# Patient Record
Sex: MALE | Employment: UNEMPLOYED | URBAN - METROPOLITAN AREA
[De-identification: names, ages, dates, MRNs, and addresses within clinical notes are randomized per-mention and may not be internally consistent; named-entity substitution may affect disease eponyms.]

---

## 2020-08-31 NOTE — PROGRESS NOTES
[facility]  [address], [address]  Phone: [phone]  Fax: [phone]        INITIAL CONSULTATION      HISTORY OF PRESENT ILLNESS:  Cara Velazquez is a 48 y.o. male whom is referred from Andra Reich MD secondary to neck pain radiating into the BUE x 15 years without trauma. He rates his pain 10/10. He really has diffuse, non-focal widespread diffusion of pain. He has treated with Prednisone, Naprosyn, and Neurontin with minimal benefit. Pt is intolerant to NEURONTIN. Patient denies previous spinal surgery, injections, or physical therapy/chiropractic care. Pt denies change in bowel or bladder habits. Pt denies dropping things or loss of balance. Pt denies fever, weight loss, or skin changes. Pt denies h/o cancer or DM. Pt is a smoker. PmHx of anxiety, depression, HTN. ER note from Dr. Per Richards dated 7/30/2019 indicating patient presented for BUE numbness. ER note from Dr. Lula Lee dated 10/12/2019 indicating patient presented for anxiety. Note from Andra Reich MD dated 7/22/0202 indicating patient was in non-compliance with medical regime. Indicated he would not schedule a f/u with the pt and the pt agreed to seek care with another provider. Indicated they gave a prescription for Neurontin. The pt took it for 5-6 days and discontinued because it made him feel weird. Pt had c/o BUE and BLE pain. Complained he can barely walk. He was referred to an orthopedist and the pt did not follow through. Pt asked for an alternative pain medication, he declined. C spine MRI dated 4/13/2019 films independently reviewed. Per report, multilevel degenerative spondylosis most pronounced C4-C6 and cervical kyphosis. Degenerative spondylosis with posterior disc protrusion C5/6 with moderate canal stenosis and cord flattening, moderate to severe degenerative left foraminal stenosis.  Posterior disc protrusion and degenerative spondylosis C6/7 with cord flattening with mild to moderate canal stenosis. Posterior disc protrusion C4/5 with mild cord flattening, without significant stenosis. No acute osseous finding, no intrinsic cord lesion. Nonspecific mildly enlarged cervical lymph nodes. The patient is RHD.  reviewed. Body mass index is 28.47 kg/m². PCP: La Nena Khan MD    Past Medical History:   Diagnosis Date    Anxiety     Depression     Hypertension         History reviewed. No pertinent surgical history. Social History     Tobacco Use    Smoking status: Current Every Day Smoker    Smokeless tobacco: Never Used   Substance Use Topics    Alcohol use: Not Currently       Work status: N/A. Marital status: N/A. No Known Allergies       History reviewed. No pertinent family history. REVIEW OF SYSTEMS  Constitutional symptoms: Negative  Eyes: Negative  Ears, Nose, Throat, and Mouth: Negative  Cardiovascular: Negative  Respiratory: Negative  Genitourinary: Negative  Integumentary (Skin and/or breast): Negative  Musculoskeletal: Positive for diffuse non-focal widespread diffusion of pain. Extremities: Negative for edema. Endocrine/Rheumatologic: Negative  Hematologic/Lymphatic: Negative  Allergic/Immunologic: Negative  Psychiatric: Negative       PHYSICAL EXAMINATION  Visit Vitals  BP (!) 134/95 (BP 1 Location: Left arm, BP Patient Position: Sitting)   Pulse 84   Temp 98.1 °F (36.7 °C) (Temporal)   Ht 5' 7\" (1.702 m)   Wt 181 lb 12.8 oz (82.5 kg)   SpO2 95%   BMI 28.47 kg/m²       CONSTITUTIONAL: NAD, A&O x 3  HEART: Regular rate and rhythm  GASTROINTESTINAL: Positive bowel sounds, soft, nontender, and nondistended  LUNGS: Clear to auscultation bilaterally. SKIN: Negative for rash. RANGE OF MOTION: The patient has full passive range of motion in all four extremities. SENSATION: Decreased sensation to light touch on digits 1, 3 and 5 of the RUE and on the RLE in a L4 and S1 distribution.  Otherwise, sensation is intact to light touch throughout. MOTOR:   Straight Leg Raise: Negative, bilateral  Dewey: Negative, bilateral  Tandem Gait: Neg. Deep tendon reflexes are 1 at the biceps, triceps, and brachioradialis bilaterally. Deep tendon reflexes are 2 at the knees and 1 at the ankles bilaterally. Shoulder AB/Flex Elbow Flex Wrist Ext Elbow Ext Wrist Flex Hand Intrin Tone   Right +4/5 +4/5 +4/5 +4/5 +4/5 +4/5 +4/5   Left +4/5 +4/5 +4/5 +4/5 +4/5 +4/5 +4/5              Hip Flex Knee Ext Knee Flex Ankle DF GTE Ankle PF Tone   Right +4/5 +4/5 +4/5 +4/5 +4/5 +4/5 +4/5   Left +4/5 +4/5 +4/5 +4/5 +4/5 +4/5 +4/5     RADIOGRAPHS  Preliminary reading of L spine plain films dated 9/1/2020 revealed:  Sacralization of L5 with Grade 1 anterolisthesis of L4 on L5 and likely pars defect L4, L5. Moderate disc space narrowing at L4-5. Small anterior osteophytes noted at L4, 5. No acute pathology identified. These are being sent out for official reading by Dr. Katina Huffman. ASSESSMENT   Diagnoses and all orders for this visit:    1. Lumbar pain  -     AMB POC XRAY, SPINE, LUMBOSACRAL; 2 O  -     EMG FOUR EXTREMITIES; Future  -     pregabalin (LYRICA) 50 mg capsule; Take 1 Cap by mouth two (2) times a day. Max Daily Amount: 100 mg.    2. Cervical neuritis  -     EMG FOUR EXTREMITIES; Future  -     pregabalin (LYRICA) 50 mg capsule; Take 1 Cap by mouth two (2) times a day. Max Daily Amount: 100 mg.    3. HNP (herniated nucleus pulposus), lumbar  -     EMG FOUR EXTREMITIES; Future  -     CT SPINE LUMB WO CONT; Future  -     pregabalin (LYRICA) 50 mg capsule; Take 1 Cap by mouth two (2) times a day. Max Daily Amount: 100 mg.    4. Lumbar neuritis  -     EMG FOUR EXTREMITIES; Future  -     CT SPINE LUMB WO CONT; Future  -     pregabalin (LYRICA) 50 mg capsule; Take 1 Cap by mouth two (2) times a day. Max Daily Amount: 100 mg.    5. DDD (degenerative disc disease), lumbar  -     EMG FOUR EXTREMITIES;  Future  -     CT SPINE LUMB WO CONT; Future  - pregabalin (LYRICA) 50 mg capsule; Take 1 Cap by mouth two (2) times a day. Max Daily Amount: 100 mg.    6. Spondylolisthesis, acquired  -     EMG FOUR EXTREMITIES; Future  -     CT SPINE LUMB WO CONT; Future  -     pregabalin (LYRICA) 50 mg capsule; Take 1 Cap by mouth two (2) times a day. Max Daily Amount: 100 mg. IMPRESSIONS/RECOMMENDATIONS:  Patient presents today with c/o diffuse non-focal widespread diffusion of pain. Multiple treatment options were discussed. I will try him on Lyrica 50 mg BID. The risks, benefits, and potential side effects of this medication were discussed. Patient understands and wishes to proceed. Patient advised to call the office if intolerant to new medication. I will refer him to Neurology for a BUE/BLE EMG. I will order a L spine CT. Patient is neurologically intact. I will see the patient back following the EMG and CT or earlier if needed. Written by Migel Nance, as dictated by Mechelle Stoll MD  I examined the patient, reviewed and agree with the note.

## 2020-09-01 ENCOUNTER — OFFICE VISIT (OUTPATIENT)
Dept: ORTHOPEDIC SURGERY | Age: 50
End: 2020-09-01

## 2020-09-01 VITALS
SYSTOLIC BLOOD PRESSURE: 134 MMHG | OXYGEN SATURATION: 95 % | BODY MASS INDEX: 28.53 KG/M2 | HEART RATE: 84 BPM | TEMPERATURE: 98.1 F | HEIGHT: 67 IN | WEIGHT: 181.8 LBS | DIASTOLIC BLOOD PRESSURE: 95 MMHG

## 2020-09-01 DIAGNOSIS — M51.36 DDD (DEGENERATIVE DISC DISEASE), LUMBAR: ICD-10-CM

## 2020-09-01 DIAGNOSIS — M54.12 CERVICAL NEURITIS: ICD-10-CM

## 2020-09-01 DIAGNOSIS — M51.26 HNP (HERNIATED NUCLEUS PULPOSUS), LUMBAR: ICD-10-CM

## 2020-09-01 DIAGNOSIS — M43.10 SPONDYLOLISTHESIS, ACQUIRED: ICD-10-CM

## 2020-09-01 DIAGNOSIS — M54.50 LUMBAR PAIN: Primary | ICD-10-CM

## 2020-09-01 DIAGNOSIS — M54.16 LUMBAR NEURITIS: ICD-10-CM

## 2020-09-01 RX ORDER — AMLODIPINE BESYLATE 10 MG/1
10 TABLET ORAL DAILY
COMMUNITY
Start: 2020-07-08

## 2020-09-01 RX ORDER — CLONAZEPAM 1 MG/1
TABLET ORAL
COMMUNITY
Start: 2020-08-21

## 2020-09-01 RX ORDER — PREGABALIN 50 MG/1
50 CAPSULE ORAL 2 TIMES DAILY
Qty: 60 CAP | Refills: 1 | Status: SHIPPED | OUTPATIENT
Start: 2020-09-01 | End: 2020-11-24 | Stop reason: SDUPTHER

## 2020-09-01 RX ORDER — OLANZAPINE 10 MG/1
TABLET ORAL
COMMUNITY
Start: 2020-07-24

## 2020-09-01 RX ORDER — FLUOXETINE HYDROCHLORIDE 20 MG/1
20 CAPSULE ORAL DAILY
COMMUNITY
Start: 2019-10-12

## 2020-09-01 NOTE — LETTER
9/1/20 Patient: Yoni Griffiths YOB: 1970 Date of Visit: 9/1/2020 MD Sebastien Dominiquert [facility] [place] [address] [id number] VIA Facsimile: [phone] Dear Venu Hernandez MD, Thank you for referring Mr. Yoni Griffiths to [address] for evaluation. My notes for this consultation are attached. If you have questions, please do not hesitate to call me. I look forward to following your patient along with you. Sincerely, Leydi Major MD

## 2020-09-08 ENCOUNTER — DOCUMENTATION ONLY (OUTPATIENT)
Dept: ORTHOPEDIC SURGERY | Age: 50
End: 2020-09-08

## 2020-09-08 NOTE — PROGRESS NOTES
CT of the Lumbar Spine without contrast has been faxed to [facility] for scheduling, [phone], fax [phone]. Joseph Chow pre-authorization [id number], effective 09/11/20-10/26/20.

## 2020-11-24 DIAGNOSIS — M54.50 LUMBAR PAIN: ICD-10-CM

## 2020-11-24 DIAGNOSIS — M51.36 DDD (DEGENERATIVE DISC DISEASE), LUMBAR: ICD-10-CM

## 2020-11-24 DIAGNOSIS — M54.12 CERVICAL NEURITIS: ICD-10-CM

## 2020-11-24 DIAGNOSIS — M43.10 SPONDYLOLISTHESIS, ACQUIRED: ICD-10-CM

## 2020-11-24 DIAGNOSIS — M54.16 LUMBAR NEURITIS: ICD-10-CM

## 2020-11-24 DIAGNOSIS — M51.26 HNP (HERNIATED NUCLEUS PULPOSUS), LUMBAR: ICD-10-CM

## 2020-11-25 RX ORDER — PREGABALIN 50 MG/1
50 CAPSULE ORAL 2 TIMES DAILY
Qty: 60 CAP | Refills: 0 | Status: SHIPPED | OUTPATIENT
Start: 2020-11-25 | End: 2020-12-28

## 2020-11-25 NOTE — TELEPHONE ENCOUNTER
Last Visit: 9/1/20 with MD Hank Cha Next Appointment: none Previous Refill Encounter(s): 9/1/20 #60 with 1 refill Requested Prescriptions Pending Prescriptions Disp Refills  pregabalin (LYRICA) 50 mg capsule 60 Cap 1 Sig: Take 1 Cap by mouth two (2) times a day. Max Daily Amount: 100 mg.

## 2020-12-25 DIAGNOSIS — M54.12 CERVICAL NEURITIS: ICD-10-CM

## 2020-12-25 DIAGNOSIS — M51.26 HNP (HERNIATED NUCLEUS PULPOSUS), LUMBAR: ICD-10-CM

## 2020-12-25 DIAGNOSIS — M54.16 LUMBAR NEURITIS: ICD-10-CM

## 2020-12-25 DIAGNOSIS — M43.10 SPONDYLOLISTHESIS, ACQUIRED: ICD-10-CM

## 2020-12-25 DIAGNOSIS — M54.50 LUMBAR PAIN: ICD-10-CM

## 2020-12-25 DIAGNOSIS — M51.36 DDD (DEGENERATIVE DISC DISEASE), LUMBAR: ICD-10-CM

## 2020-12-28 RX ORDER — PREGABALIN 50 MG/1
CAPSULE ORAL
Qty: 60 CAP | Refills: 0 | Status: SHIPPED | OUTPATIENT
Start: 2020-12-28

## 2021-01-12 DIAGNOSIS — M51.36 DDD (DEGENERATIVE DISC DISEASE), LUMBAR: ICD-10-CM

## 2021-01-12 DIAGNOSIS — M54.16 LUMBAR NEURITIS: ICD-10-CM

## 2021-01-12 DIAGNOSIS — M51.26 HNP (HERNIATED NUCLEUS PULPOSUS), LUMBAR: ICD-10-CM

## 2021-01-12 DIAGNOSIS — M54.50 LUMBAR PAIN: ICD-10-CM

## 2021-01-12 DIAGNOSIS — M54.12 CERVICAL NEURITIS: ICD-10-CM

## 2021-01-12 DIAGNOSIS — M43.10 SPONDYLOLISTHESIS, ACQUIRED: ICD-10-CM

## 2021-02-15 NOTE — PROGRESS NOTES
[facility]  [address], [place] [address]   Phone: [phone]  Fax: [phone]        PROGRESS NOTE      HISTORY OF PRESENT ILLNESS:  The patient is a 48 y.o. male and was seen today for follow up of neck pain radiating into the BUE x 15 years without trauma. He really has diffuse, non-focal widespread diffusion of pain. He has treated with Prednisone, Naprosyn, and Neurontin with minimal benefit. Pt is intolerant to NEURONTIN. Patient denies previous spinal surgery, injections, or physical therapy/chiropractic care. Pt denies change in bowel or bladder habits. Pt denies dropping things or loss of balance. Pt denies fever, weight loss, or skin changes. Pt denies h/o cancer or DM. Pt is a smoker. The patient is RHD. PmHx of anxiety, depression, HTN. ER note from Dr. Lois Saldaña dated 7/30/2019 indicating patient presented for BUE numbness. ER note from Dr. Jermaine Cortez dated 10/12/2019 indicating patient presented for anxiety. Note from Marni Mcdonald MD dated 7/22/0202 indicating patient was in non-compliance with medical regime. Indicated he would not schedule a f/u with the pt and the pt agreed to seek care with another provider. Indicated they gave a prescription for Neurontin. The pt took it for 5-6 days and discontinued because it made him feel weird. Pt had c/o BUE and BLE pain. Complained he can barely walk. He was referred to an orthopedist and the pt did not follow through. Pt asked for an alternative pain medication, he declined. C spine MRI dated 4/13/2019 films independently reviewed. Per report, multilevel degenerative spondylosis most pronounced C4-C6 and cervical kyphosis. Degenerative spondylosis with posterior disc protrusion C5/6 with moderate canal stenosis and cord flattening, moderate to severe degenerative left foraminal stenosis. Posterior disc protrusion and degenerative spondylosis C6/7 with cord flattening with mild to moderate canal stenosis. Posterior disc protrusion C4/5 with mild cord flattening, without significant stenosis. No acute osseous finding, no intrinsic cord lesion. Nonspecific mildly enlarged cervical lymph nodes. Preliminary reading of L spine plain films dated 9/1/2020 revealed: Sacralization of L5 with Grade 1 anterolisthesis of L4 on L5 and likely pars defect L4, L5. Moderate disc space narrowing at L4-5. Small anterior osteophytes noted at L4, 5. No acute pathology identified. At his last clinical appointment, I tried him on Lyrica 50 mg BID. I referred him to Neurology for a BUE/BLE EMG. I ordered a L spine CT. The patient returns today with diffuse widespread polyarticular pain complaints. Herates his pain 5-10/10, previously 10/10. I previously ordered a L spine CT which did not happen for reasons that are unclear. Pt reports intolerance to LYRICA 50 mg BID secondary to difficulty swallowing. Pt denies change in bowel or bladder habits. BLE EMG dated 11/17/2020 by Dr. Tisha Pozo was normal. BUE EMG dated 12/1/2020 by Dr. Tisha Pozo was normal.      reviewed. Body mass index is 29.89 kg/m².     PCP: Ben Mancini MD      Past Medical History:   Diagnosis Date    Anxiety     Depression     Hypertension         Social History     Socioeconomic History    Marital status: SINGLE     Spouse name: Not on file    Number of children: Not on file    Years of education: Not on file    Highest education level: Not on file   Occupational History    Not on file   Social Needs    Financial resource strain: Not on file    Food insecurity     Worry: Not on file     Inability: Not on file    Transportation needs     Medical: Not on file     Non-medical: Not on file   Tobacco Use    Smoking status: Current Every Day Smoker    Smokeless tobacco: Never Used   Substance and Sexual Activity    Alcohol use: Not Currently    Drug use: Not on file    Sexual activity: Not on file   Lifestyle    Physical activity     Days per week: Not on file     Minutes per session: Not on file    Stress: Not on file   Relationships    Social connections     Talks on phone: Not on file     Gets together: Not on file     Attends Bahai service: Not on file     Active member of club or organization: Not on file     Attends meetings of clubs or organizations: Not on file     Relationship status: Not on file    Intimate partner violence     Fear of current or ex partner: Not on file     Emotionally abused: Not on file     Physically abused: Not on file     Forced sexual activity: Not on file   Other Topics Concern    Not on file   Social History Narrative    Not on file       Current Outpatient Medications   Medication Sig Dispense Refill    pregabalin (LYRICA) 50 mg capsule take 1 capsule by mouth twice a day (MAX DAILY AMOUNT: 100 MG) 60 Cap 0    amLODIPine (NORVASC) 10 mg tablet Take 10 mg by mouth daily.  clonazePAM (KlonoPIN) 1 mg tablet take 1 tablet by mouth at bedtime if needed      FLUoxetine (PROzac) 20 mg capsule Take 20 mg by mouth daily.  OLANZapine (ZyPREXA) 10 mg tablet take 1 tablet by mouth every evening         No Known Allergies       PHYSICAL EXAMINATION    Visit Vitals  BP (!) 126/95 (BP 1 Location: Left upper arm)   Pulse 84   Temp 97.7 °F (36.5 °C)   Resp 18   Ht 5' 6.5\" (1.689 m)   Wt 188 lb (85.3 kg)   SpO2 99%   BMI 29.89 kg/m²       CONSTITUTIONAL: NAD, A&O x 3  SENSATION: Decreased sensation to light touch on digits 1, 3, and 4 of the LUE and on the RLE circumferentially. Otherwise, intact to light touch throughout  NEURO: Tasha's is negative bilaterally. RANGE OF MOTION: The patient has full passive range of motion in all four extremities.   MOTOR:  Straight Leg Raise: Negative, bilateral       Shoulder AB/Flex Elbow Flex Wrist Ext Elbow Ext Wrist Flex Hand Intrin Tone   Right +4/5 +4/5 +4/5 +4/5 +4/5 +4/5 +4/5   Left +4/5 +4/5 +4/5 +4/5 +4/5 +4/5 +4/5              Hip Flex Knee Ext Knee Flex Ankle DF GTE Ankle PF Tone   Right +4/5 +4/5 +4/5 +4/5 +4/5 +4/5 +4/5   Left +4/5 +4/5 +4/5 +4/5 +4/5 +4/5 +4/5       ASSESSMENT   Diagnoses and all orders for this visit:    1. Lumbar pain    2. Cervical neuritis    3. HNP (herniated nucleus pulposus), lumbar    4. Lumbar neuritis    5. DDD (degenerative disc disease), lumbar    6. Spondylolisthesis, acquired        IMPRESSION AND PLAN:  Patient returns to the office today with c/o diffuse widespread polyarticular pain complaints. Multiple treatment options were discussed. I will wean him off of Lyrica 50 mg BID secondary to intolerance. I will reorder the L spine CT. I advised patient to bring copies of films to next visit. I will refer him to rheumatology. Patient is neurologically intact. I will see the patient back in following CT or earlier if needed. Written by Bridget Headley, as dictated by Valiant Peabody, MD  I examined the patient, reviewed and agree with the note.

## 2021-02-17 ENCOUNTER — OFFICE VISIT (OUTPATIENT)
Dept: ORTHOPEDIC SURGERY | Age: 51
End: 2021-02-17
Payer: MEDICAID

## 2021-02-17 ENCOUNTER — TELEPHONE (OUTPATIENT)
Dept: ORTHOPEDIC SURGERY | Age: 51
End: 2021-02-17

## 2021-02-17 VITALS
BODY MASS INDEX: 29.51 KG/M2 | RESPIRATION RATE: 18 BRPM | HEART RATE: 84 BPM | WEIGHT: 188 LBS | DIASTOLIC BLOOD PRESSURE: 95 MMHG | HEIGHT: 67 IN | SYSTOLIC BLOOD PRESSURE: 126 MMHG | TEMPERATURE: 97.7 F | OXYGEN SATURATION: 99 %

## 2021-02-17 DIAGNOSIS — M54.12 CERVICAL NEURITIS: ICD-10-CM

## 2021-02-17 DIAGNOSIS — M54.16 LUMBAR NEURITIS: ICD-10-CM

## 2021-02-17 DIAGNOSIS — M54.50 LUMBAR PAIN: Primary | ICD-10-CM

## 2021-02-17 DIAGNOSIS — M43.10 SPONDYLOLISTHESIS, ACQUIRED: ICD-10-CM

## 2021-02-17 DIAGNOSIS — M51.36 DDD (DEGENERATIVE DISC DISEASE), LUMBAR: ICD-10-CM

## 2021-02-17 DIAGNOSIS — M51.26 HNP (HERNIATED NUCLEUS PULPOSUS), LUMBAR: ICD-10-CM

## 2021-02-17 PROCEDURE — 99213 OFFICE O/P EST LOW 20 MIN: CPT | Performed by: PHYSICAL MEDICINE & REHABILITATION

## 2021-02-17 NOTE — TELEPHONE ENCOUNTER
Mr. Mishel Hayes was brought into my office to schedule a CT of the Lumbar Spine without contrast. I tried to explain to him the test ordered in September was approved by Neida Zelaya and [facility] should have contacted him to schedule. He stated he did not have [address] [place] [address], he has [facility]. I tried to explain [employer] Zelaya is the clearinghouse but he became frustrated, called me crazy and left the office. CT of the Lumbar Spine without contrast is being faxed to [facility] to schedule. He can contact Krishna at [phone]. [place] Duque pre-authorization is [id number], effective 02/22/21-04/08/21.

## 2021-02-17 NOTE — LETTER
2/17/2021 Patient: Jax Drew YOB: 1970 Date of Visit: 2/17/2021 Brian Argueta MD 
[address] [place] [address] [address] Fax: [phone] Dear Brian Argueta MD, Thank you for referring Mr. Jax Drew to [facility] for evaluation. My notes for this consultation are attached. If you have questions, please do not hesitate to call me. I look forward to following your patient along with you. Sincerely, Sharath Cedeno MD

## 2021-03-03 DIAGNOSIS — M54.50 LUMBAR PAIN: ICD-10-CM
